# Patient Record
Sex: FEMALE | Race: OTHER | HISPANIC OR LATINO | Employment: FULL TIME | URBAN - METROPOLITAN AREA
[De-identification: names, ages, dates, MRNs, and addresses within clinical notes are randomized per-mention and may not be internally consistent; named-entity substitution may affect disease eponyms.]

---

## 2020-01-21 PROBLEM — I10 ESSENTIAL HYPERTENSION: Status: ACTIVE | Noted: 2020-01-21

## 2020-01-21 PROBLEM — G45.9 TIA (TRANSIENT ISCHEMIC ATTACK): Status: ACTIVE | Noted: 2020-01-21

## 2021-02-18 ENCOUNTER — TELEPHONE (OUTPATIENT)
Dept: CARDIOLOGY | Facility: MEDICAL CENTER | Age: 61
End: 2021-02-18

## 2021-02-18 NOTE — TELEPHONE ENCOUNTER
Called new patient in regards to her appointment with  on 02/24/21 @ 9:30am to ask about her previous cardiologist, recent blood work/cardiac testing, and to let her know she will have an EKG on his appointment date. No answer, left voicemail with a callback number.

## 2021-02-24 ENCOUNTER — TELEPHONE (OUTPATIENT)
Dept: CARDIOLOGY | Facility: PHYSICIAN GROUP | Age: 61
End: 2021-02-24

## 2021-02-24 ENCOUNTER — OFFICE VISIT (OUTPATIENT)
Dept: CARDIOLOGY | Facility: PHYSICIAN GROUP | Age: 61
End: 2021-02-24

## 2021-02-24 ENCOUNTER — NON-PROVIDER VISIT (OUTPATIENT)
Dept: CARDIOLOGY | Facility: PHYSICIAN GROUP | Age: 61
End: 2021-02-24

## 2021-02-24 VITALS
HEIGHT: 61 IN | DIASTOLIC BLOOD PRESSURE: 60 MMHG | WEIGHT: 121 LBS | HEART RATE: 60 BPM | SYSTOLIC BLOOD PRESSURE: 120 MMHG | BODY MASS INDEX: 22.84 KG/M2 | OXYGEN SATURATION: 99 %

## 2021-02-24 DIAGNOSIS — G45.9 TIA (TRANSIENT ISCHEMIC ATTACK): ICD-10-CM

## 2021-02-24 DIAGNOSIS — G47.31 CENTRAL SLEEP APNEA: ICD-10-CM

## 2021-02-24 DIAGNOSIS — I47.10 SVT (SUPRAVENTRICULAR TACHYCARDIA) (HCC): ICD-10-CM

## 2021-02-24 DIAGNOSIS — Q21.12 PFO (PATENT FORAMEN OVALE): ICD-10-CM

## 2021-02-24 DIAGNOSIS — I48.0 PAF (PAROXYSMAL ATRIAL FIBRILLATION) (HCC): ICD-10-CM

## 2021-02-24 DIAGNOSIS — I34.1 MITRAL VALVE PROLAPSE: ICD-10-CM

## 2021-02-24 DIAGNOSIS — I10 ESSENTIAL HYPERTENSION: ICD-10-CM

## 2021-02-24 DIAGNOSIS — E78.5 DYSLIPIDEMIA: ICD-10-CM

## 2021-02-24 DIAGNOSIS — R07.89 OTHER CHEST PAIN: ICD-10-CM

## 2021-02-24 PROCEDURE — 99204 OFFICE O/P NEW MOD 45 MIN: CPT | Performed by: INTERNAL MEDICINE

## 2021-02-24 RX ORDER — CLONAZEPAM 0.5 MG/1
0.5 TABLET ORAL PRN
COMMUNITY

## 2021-02-24 RX ORDER — TELMISARTAN 40 MG/1
TABLET ORAL DAILY
COMMUNITY
End: 2021-02-24

## 2021-02-24 NOTE — LETTER
[facility]   [address] [place] [address]  Phone: [phone]  Fax: [phone]              Lolly Chen  1960    Encounter Date: 2/24/2021    Melinda Garcia M.D.          PROGRESS NOTE:  Subjective:   Chief Complaint:   Chief Complaint   Patient presents with   • Dyslipidemia       Lolly Chen is a 60 y.o. female who is referred by Dr. Jennifer Gilbert for TIA, HTN, side effects to meds, PAF, PFO, palpitations, mitral valve prolapse.    Has HTN since 2015 but did not treat it as she lives a healthy lifestyle.  Can feel when it is elevated.    Nov 2019 had TIA, woke up with sx of very high BP, had hemiparesis on right, went to the hospital, gibberish speech.  Next month, BP went high, similar symptoms but they resolved.  Was then having trouble with sleep and anxiety after that.    Daytime BP is 140.  Wakes up in the middle of the night with BP >200 systolic.  Sometimes drops to 99 during the day. Changed meds around, better.    Started meds for HTN but having some side effects.    Heart monitor in [place] with a 5 beat atrial run of occult atrial fibrillation, also PVCs and PACs.  Notes heart racing with palpitations, will race for a few seconds and then stop.  Very symptomatic.    Lives in [place].  Travels to [place].    Prior meds:  -Metoprolol Succinate 100 mg  -Losartan 50 mg    Currently on telmisartan and nebivolol.    Sees cardiologist in [place] who started lexapro.    No prior hyperlipidemia, LDL 98.  No family history of premature coronary artery disease.  Parents with HTN.  No prior smoking history.  No history of diabetes.  No history of autoimmune disease such as lupus or rheumatoid arthritis.  No chronic kidney disease.  No ETOH overuse.  No caffeine overuse.  No recreation substance use.  No hx asthma.    Exercises, vegetarian.    DATA REVIEWED by me:  ECG (my personal interpretation) 2-24-21  Sinus, 59,  RAD    Holter monitor [place] 9-  PVCs and PACs    Echo [place]  Septum 0.9, posterior wall 1, EF 73%, trileaflet aortic valve, mitral valve prolapse of the anterior wall, mild mitral regurgitation, RVSP 23, aneurysmal septum, positive bubble study with possible PFO    Most recent labs:       Lab Results   Component Value Date/Time    WBC 7.2 12/21/2017 07:45 AM    HEMOGLOBIN 14.7 12/21/2017 07:45 AM    HEMATOCRIT 44.2 12/21/2017 07:45 AM    MCV 95.7 12/21/2017 07:45 AM      Lab Results   Component Value Date/Time    SODIUM 143 12/21/2017 07:45 AM    POTASSIUM 4.3 12/21/2017 07:45 AM    CHLORIDE 106 12/21/2017 07:45 AM    CO2 26 12/21/2017 07:45 AM    GLUCOSE 94 12/21/2017 07:45 AM    BUN 16 12/21/2017 07:45 AM    CREATININE 0.8 12/21/2017 07:45 AM      Lab Results   Component Value Date/Time    ASTSGOT 17 12/21/2017 07:45 AM    ALTSGPT 18 12/21/2017 07:45 AM    ALBUMIN 4.0 12/21/2017 07:45 AM      Lab Results   Component Value Date/Time    CHOLSTRLTOT 164 12/21/2017 07:45 AM    LDL 98 12/21/2017 07:45 AM    HDL 48.0 12/21/2017 07:45 AM    TRIGLYCERIDE 88 12/21/2017 07:45 AM     No results for input(s): NTPROBNP, TROPONINT in the last 72 hours.      Past Medical History:   Diagnosis Date   • History of breast implant    • History of mumps    • History of pneumonia    • Hypertension      Past Surgical History:   Procedure Laterality Date   • MAMMOPLASTY AUGMENTATION Bilateral 2014     Family History   Problem Relation Age of Onset   • Hypertension Mother    • Hypertension Father    • Hypertension Brother    • Cancer Maternal Grandmother         Liver   • Cancer Paternal Uncle    • Cancer Paternal Uncle    • Cancer Maternal Aunt    • Suicide Attempts Paternal Grandfather         Successful     Social History     Socioeconomic History   • Marital status:      Spouse name: Not on file   • Number of children: 3   • Years of education: 16   • Highest education level: Not on file   Occupational History   • Not  on file   Tobacco Use   • Smoking status: Never Smoker   • Smokeless tobacco: Never Used   Substance and Sexual Activity   • Alcohol use: Never   • Drug use: No   • Sexual activity: Not on file   Other Topics Concern   • Not on file   Social History Narrative   • Not on file     Social Determinants of Health     Financial Resource Strain:    • Difficulty of Paying Living Expenses:    Food Insecurity:    • Worried About Running Out of Food in the Last Year:    • Ran Out of Food in the Last Year:    Transportation Needs:    • Lack of Transportation (Medical):    • Lack of Transportation (Non-Medical):    Physical Activity:    • Days of Exercise per Week:    • Minutes of Exercise per Session:    Stress:    • Feeling of Stress :    Social Connections:    • Frequency of Communication with Friends and Family:    • Frequency of Social Gatherings with Friends and Family:    • Attends Sikhism Services:    • Active Member of Clubs or Organizations:    • Attends Club or Organization Meetings:    • Marital Status:    Intimate Partner Violence:    • Fear of Current or Ex-Partner:    • Emotionally Abused:    • Physically Abused:    • Sexually Abused:      No Known Allergies    Current Outpatient Medications   Medication Sig Dispense Refill   • clonazePAM (KLONOPIN) 0.5 MG Tab Take  by mouth.     • Escitalopram Oxalate (LEXAPRO PO) Take 2.5 mg by mouth every day.     • Coenzyme Q10 (COQ10) 100 MG Cap Take 1 Capsule by mouth every day.     • Vitamin D-Vitamin K (VITAMIN K2-VITAMIN D3 PO) Take  by mouth.     • telmisartan (MICARDIS) 40 MG Tab Take 1 Tab by mouth every day. 90 Tab 1   • nebivolol (BYSTOLIC) 10 MG Tab Take 1 Tab by mouth every day. (Patient taking differently: Take 5 mg by mouth every day.) 30 Tab 1   • aspirin 81 MG tablet Take 81 mg by mouth every day.     • Turmeric, Curcuma Longa, (CURCUMIN) Powder by Does not apply route.     • VITAMIN E PO Take  by mouth.     • Ascorbic Acid (VITAMIN C PO) Take  by mouth.    "    No current facility-administered medications for this visit.       ROS  All others systems reviewed and negative.     Objective:     /60 (BP Location: Left arm, Patient Position: Sitting, BP Cuff Size: Adult)   Pulse 60   Ht 1.549 m (5' 1\")   Wt 54.9 kg (121 lb)   SpO2 99%  Body mass index is 22.86 kg/m².    General: No acute distress. Well nourished.  HEENT: EOM grossly intact, no scleral icterus, no pharyngeal erythema.   Neck:  No JVD, no bruits, trachea midline  CVS: RRR. Normal S1, S2. No M/R/G. No LE edema.  2+ radial pulses, 2+ PT pulses  Resp: CTAB. No wheezing or crackles/rhonchi. Normal respiratory effort.  Abdomen: Soft, NT, no mo hepatomegaly.  MSK/Ext: No clubbing or cyanosis.  Skin: Warm and dry, no rashes.  Neurological: CN III-XII grossly intact. No focal deficits.   Psych: A&O x 3, appropriate affect, good judgement        Assessment:     1. TIA (transient ischemic attack)  EKG    REFERRAL TO PULMONARY AND SLEEP MEDICINE    ZIO PATCH MONITOR   2. Essential hypertension  EKG    REFERRAL TO PULMONARY AND SLEEP MEDICINE   3. Dyslipidemia  EKG   4. Other chest pain     5. Central sleep apnea  REFERRAL TO PULMONARY AND SLEEP MEDICINE   6. PAF (paroxysmal atrial fibrillation) ([facility])  ZIO PATCH MONITOR   7. PFO (patent foramen ovale)  EC-ECHOCARDIOGRAM COMPLETE W/O CONT   8. Mitral valve prolapse         Medical Decision Making:  Today's Assessment / Status / Plan:     -Complex situation with multiple causes for stroke  -Does sound like TIA was from uncontrolled HTN but may have been related to PFO or even PAF  -Labile HTN, elevated jennifer at night, needs sleep study, referral made  -If she has atrial fibrillation, she will need to be on anticoagulation for chads 2 vascular score of 4  -Continue aspirin for now  -LDL ok without statin  -Palpitations are bothersome, previously intolerant to metoprolol succinate 100 mg, better on nebivolol  -Needs testing and close follow-up, see below  -Return " to clinic 4 weeks      Written instructions given today:    -Call [facility] to get evaluated for Central Sleep Apnea.    -If you have a patent foramen ovale, which is a hole in your heart, that can be related to stroke.  I need another echocardiogram with a bubble study done at [facility].  We cannot do it in [place] because they need to start an IV.    -If you have a significant PFO, we usually do a transesophageal echocardiogram down the road where we put you to sleep and pass a probe down your esophagus to see the hole in the heart better to see if it needs a closure device because you have had a stroke.  The closure device is typically an Amplatzer closure device.  You can read about it on the Internet.    -Diagnosing atrial fibrillation takes time.  Based on your prior Holter, we cannot say that is truly atrial fibrillation.  It matters because if you had a stroke you need to be on full blood thinner.  We will start with a Zio patch heart monitor for 2 weeks.    -Your heart echocardiogram in [place] also said that you had mitral valve prolapse with mildly leaking valve.  We will be able to see if this is true on her echocardiogram here, it can be associated with rhythm changes of the heart such as atrial fibrillation.      Return in about 4 weeks (around 3/24/2021).    It is my pleasure to participate in the care of Ms. Chen.  Please do not hesitate to contact me with questions or concerns.    Melinda Garcia MD, [facility]  Cardiologist [facility]    Please note that this dictation was created using voice recognition software. I have made every reasonable attempt to correct obvious errors, but it is possible there are errors of grammar and possibly content that I did not discover before finalizing the note.        Jennifer Gilbert D.O.  [address]  [place] [address]  Via In Basket

## 2021-02-24 NOTE — PATIENT INSTRUCTIONS
-Call [facility] to get evaluated for Central Sleep Apnea.    -If you have a patent foramen ovale, which is a hole in your heart, that can be related to stroke.  I need another echocardiogram with a bubble study done at [facility].  We cannot do it in [place] because they need to start an IV.    -If you have a significant PFO, we usually do a transesophageal echocardiogram down the road where we put you to sleep and pass a probe down your esophagus to see the hole in the heart better to see if it needs a closure device because you have had a stroke.  The closure device is typically an Amplatzer closure device.  You can read about it on the Internet.    -Diagnosing atrial fibrillation takes time.  Based on your prior Holter, we cannot say that is truly atrial fibrillation.  It matters because if you had a stroke you need to be on full blood thinner.  We will start with a Zio patch heart monitor for 2 weeks.    -Your heart echocardiogram in [place] also said that you had mitral valve prolapse with mildly leaking valve.  We will be able to see if this is true on her echocardiogram here, it can be associated with rhythm changes of the heart such as atrial fibrillation.

## 2021-02-24 NOTE — PROGRESS NOTES
Subjective:   Chief Complaint:   Chief Complaint   Patient presents with   • Dyslipidemia       Lolly Chen is a 60 y.o. female who is referred by Dr. Jennifer Gilbert for TIA, HTN, side effects to meds, PAF, PFO, palpitations, mitral valve prolapse.    Has HTN since 2015 but did not treat it as she lives a healthy lifestyle.  Can feel when it is elevated.    Nov 2019 had TIA, woke up with sx of very high BP, had hemiparesis on right, went to the hospital, gibberish speech.  Next month, BP went high, similar symptoms but they resolved.  Was then having trouble with sleep and anxiety after that.    Daytime BP is 140.  Wakes up in the middle of the night with BP >200 systolic.  Sometimes drops to 99 during the day. Changed meds around, better.    Started meds for HTN but having some side effects.    Heart monitor in [place] with a 5 beat atrial run of occult atrial fibrillation, also PVCs and PACs.  Notes heart racing with palpitations, will race for a few seconds and then stop.  Very symptomatic.    Lives in [place].  Travels to [place].    Prior meds:  -Metoprolol Succinate 100 mg  -Losartan 50 mg    Currently on telmisartan and nebivolol.    Sees cardiologist in [place] who started lexapro.    No prior hyperlipidemia, LDL 98.  No family history of premature coronary artery disease.  Parents with HTN.  No prior smoking history.  No history of diabetes.  No history of autoimmune disease such as lupus or rheumatoid arthritis.  No chronic kidney disease.  No ETOH overuse.  No caffeine overuse.  No recreation substance use.  No hx asthma.    Exercises, vegetarian.    DATA REVIEWED by me:  ECG (my personal interpretation) 2-24-21  Sinus, 59, RAD    Holter monitor [place] 9-  PVCs and PACs    Echo [place]  Septum 0.9, posterior wall 1, EF 73%, trileaflet aortic valve, mitral valve prolapse of the anterior wall, mild mitral regurgitation, RVSP 23, aneurysmal septum, positive bubble study with possible PFO    Most  recent labs:       Lab Results   Component Value Date/Time    WBC 7.2 12/21/2017 07:45 AM    HEMOGLOBIN 14.7 12/21/2017 07:45 AM    HEMATOCRIT 44.2 12/21/2017 07:45 AM    MCV 95.7 12/21/2017 07:45 AM      Lab Results   Component Value Date/Time    SODIUM 143 12/21/2017 07:45 AM    POTASSIUM 4.3 12/21/2017 07:45 AM    CHLORIDE 106 12/21/2017 07:45 AM    CO2 26 12/21/2017 07:45 AM    GLUCOSE 94 12/21/2017 07:45 AM    BUN 16 12/21/2017 07:45 AM    CREATININE 0.8 12/21/2017 07:45 AM      Lab Results   Component Value Date/Time    ASTSGOT 17 12/21/2017 07:45 AM    ALTSGPT 18 12/21/2017 07:45 AM    ALBUMIN 4.0 12/21/2017 07:45 AM      Lab Results   Component Value Date/Time    CHOLSTRLTOT 164 12/21/2017 07:45 AM    LDL 98 12/21/2017 07:45 AM    HDL 48.0 12/21/2017 07:45 AM    TRIGLYCERIDE 88 12/21/2017 07:45 AM     No results for input(s): NTPROBNP, TROPONINT in the last 72 hours.      Past Medical History:   Diagnosis Date   • History of breast implant    • History of mumps    • History of pneumonia    • Hypertension      Past Surgical History:   Procedure Laterality Date   • MAMMOPLASTY AUGMENTATION Bilateral 2014     Family History   Problem Relation Age of Onset   • Hypertension Mother    • Hypertension Father    • Hypertension Brother    • Cancer Maternal Grandmother         Liver   • Cancer Paternal Uncle    • Cancer Paternal Uncle    • Cancer Maternal Aunt    • Suicide Attempts Paternal Grandfather         Successful     Social History     Socioeconomic History   • Marital status:      Spouse name: Not on file   • Number of children: 3   • Years of education: 16   • Highest education level: Not on file   Occupational History   • Not on file   Tobacco Use   • Smoking status: Never Smoker   • Smokeless tobacco: Never Used   Substance and Sexual Activity   • Alcohol use: Never   • Drug use: No   • Sexual activity: Not on file   Other Topics Concern   • Not on file   Social History Narrative   • Not on file  "    Social Determinants of Health     Financial Resource Strain:    • Difficulty of Paying Living Expenses:    Food Insecurity:    • Worried About Running Out of Food in the Last Year:    • Ran Out of Food in the Last Year:    Transportation Needs:    • Lack of Transportation (Medical):    • Lack of Transportation (Non-Medical):    Physical Activity:    • Days of Exercise per Week:    • Minutes of Exercise per Session:    Stress:    • Feeling of Stress :    Social Connections:    • Frequency of Communication with Friends and Family:    • Frequency of Social Gatherings with Friends and Family:    • Attends [employer] Services:    • Active Member of Clubs or Organizations:    • Attends Club or Organization Meetings:    • Marital Status:    Intimate Partner Violence:    • Fear of Current or Ex-Partner:    • Emotionally Abused:    • Physically Abused:    • Sexually Abused:      No Known Allergies    Current Outpatient Medications   Medication Sig Dispense Refill   • clonazePAM (KLONOPIN) 0.5 MG Tab Take  by mouth.     • Escitalopram Oxalate (LEXAPRO PO) Take 2.5 mg by mouth every day.     • Coenzyme Q10 (COQ10) 100 MG Cap Take 1 Capsule by mouth every day.     • Vitamin D-Vitamin K (VITAMIN K2-VITAMIN D3 PO) Take  by mouth.     • telmisartan (MICARDIS) 40 MG Tab Take 1 Tab by mouth every day. 90 Tab 1   • nebivolol (BYSTOLIC) 10 MG Tab Take 1 Tab by mouth every day. (Patient taking differently: Take 5 mg by mouth every day.) 30 Tab 1   • aspirin 81 MG tablet Take 81 mg by mouth every day.     • Turmeric, Curcuma Longa, (CURCUMIN) Powder by Does not apply route.     • VITAMIN E PO Take  by mouth.     • Ascorbic Acid (VITAMIN C PO) Take  by mouth.       No current facility-administered medications for this visit.       ROS  All others systems reviewed and negative.     Objective:     /60 (BP Location: Left arm, Patient Position: Sitting, BP Cuff Size: Adult)   Pulse 60   Ht 1.549 m (5' 1\")   Wt 54.9 kg (121 lb)   " SpO2 99%  Body mass index is 22.86 kg/m².    General: No acute distress. Well nourished.  HEENT: EOM grossly intact, no scleral icterus, no pharyngeal erythema.   Neck:  No JVD, no bruits, trachea midline  CVS: RRR. Normal S1, S2. No M/R/G. No LE edema.  2+ radial pulses, 2+ PT pulses  Resp: CTAB. No wheezing or crackles/rhonchi. Normal respiratory effort.  Abdomen: Soft, NT, no mo hepatomegaly.  MSK/Ext: No clubbing or cyanosis.  Skin: Warm and dry, no rashes.  Neurological: CN III-XII grossly intact. No focal deficits.   Psych: A&O x 3, appropriate affect, good judgement        Assessment:     1. TIA (transient ischemic attack)  EKG    REFERRAL TO PULMONARY AND SLEEP MEDICINE    ZIO PATCH MONITOR   2. Essential hypertension  EKG    REFERRAL TO PULMONARY AND SLEEP MEDICINE   3. Dyslipidemia  EKG   4. Other chest pain     5. Central sleep apnea  REFERRAL TO PULMONARY AND SLEEP MEDICINE   6. PAF (paroxysmal atrial fibrillation) ([facility])  ZIO PATCH MONITOR   7. PFO (patent foramen ovale)  EC-ECHOCARDIOGRAM COMPLETE W/O CONT   8. Mitral valve prolapse         Medical Decision Making:  Today's Assessment / Status / Plan:     -Complex situation with multiple causes for stroke  -Does sound like TIA was from uncontrolled HTN but may have been related to PFO or even PAF  -Labile HTN, elevated jennifer at night, needs sleep study, referral made  -If she has atrial fibrillation, she will need to be on anticoagulation for chads 2 vascular score of 4  -Continue aspirin for now  -LDL ok without statin  -Palpitations are bothersome, previously intolerant to metoprolol succinate 100 mg, better on nebivolol  -Needs testing and close follow-up, see below  -Return to clinic 4 weeks      Written instructions given today:    -Call [facility] to get evaluated for Central Sleep Apnea.    -If you have a patent foramen ovale, which is a hole in your heart, that can be related to stroke.  I need another echocardiogram with a  bubble study done at [facility].  We cannot do it in [place] because they need to start an IV.    -If you have a significant PFO, we usually do a transesophageal echocardiogram down the road where we put you to sleep and pass a probe down your esophagus to see the hole in the heart better to see if it needs a closure device because you have had a stroke.  The closure device is typically an Amplatzer closure device.  You can read about it on the Internet.    -Diagnosing atrial fibrillation takes time.  Based on your prior Holter, we cannot say that is truly atrial fibrillation.  It matters because if you had a stroke you need to be on full blood thinner.  We will start with a Zio patch heart monitor for 2 weeks.    -Your heart echocardiogram in [place] also said that you had mitral valve prolapse with mildly leaking valve.  We will be able to see if this is true on her echocardiogram here, it can be associated with rhythm changes of the heart such as atrial fibrillation.      Return in about 4 weeks (around 3/24/2021).    It is my pleasure to participate in the care of Ms. Chen.  Please do not hesitate to contact me with questions or concerns.    Melinda Garcia MD, [facility]  Cardiologist [facility]    Please note that this dictation was created using voice recognition software. I have made every reasonable attempt to correct obvious errors, but it is possible there are errors of grammar and possibly content that I did not discover before finalizing the note.

## 2021-03-22 PROCEDURE — 93248 EXT ECG>7D<15D REV&INTERPJ: CPT | Performed by: INTERNAL MEDICINE

## 2021-03-22 PROCEDURE — 93246 EXT ECG>7D<15D RECORDING: CPT | Performed by: INTERNAL MEDICINE

## 2021-04-05 ENCOUNTER — HOSPITAL ENCOUNTER (OUTPATIENT)
Dept: CARDIOLOGY | Facility: MEDICAL CENTER | Age: 61
End: 2021-04-05
Attending: INTERNAL MEDICINE

## 2021-04-05 DIAGNOSIS — Q21.12 PFO (PATENT FORAMEN OVALE): ICD-10-CM

## 2021-04-05 PROBLEM — Z78.9 NON-SMOKER: Status: ACTIVE | Noted: 2021-04-05

## 2021-04-05 PROBLEM — G47.33 OSA (OBSTRUCTIVE SLEEP APNEA): Status: ACTIVE | Noted: 2021-04-05

## 2021-04-05 LAB
LV EJECT FRACT  99904: 65
LV EJECT FRACT MOD 2C 99903: 75
LV EJECT FRACT MOD 4C 99902: 78.59
LV EJECT FRACT MOD BP 99901: 76.53

## 2021-04-05 PROCEDURE — 93306 TTE W/DOPPLER COMPLETE: CPT

## 2021-04-05 PROCEDURE — 93306 TTE W/DOPPLER COMPLETE: CPT | Mod: 26 | Performed by: INTERNAL MEDICINE

## 2021-04-05 NOTE — PROGRESS NOTES
CC: Telemedicine sleep consultation    This sleep consultation is provided using Telemedicine and secure encrypted software. Consent was obtained.    Consulting MD: Charly Steward M.D.  Requesting Physician: Dr. Melinda Garcia MD  Patient name:      Lolly Chen                  Please note that I could not evaluate the patient in person at the site. All examination and evaluation of the patient and information gathering from the patient and/or the family were done jointly by me via licensed and securely encrypted tele-video communication equipment with the assistance of a trained tele-presenter at the originating site.  As such, my assessments and recommendations are limited as far as such telecommunication allows.    HPI:  Lolly Chen is a 60 y.o.female from [place] (works in childcare) kindly referred by Dr. Melinda Garcia MD for evaluation of ELIE.  She usually works from 8 AM to 5 PM.  She goes to bed at 10-11 PM and gets up at 6:45 AM on weekdays and 8 AM on weekends    She judges her problem to be considerably serious as her blood pressure increases considerably when she sleeps.    She does not have a regular bed partner.  She estimates that her sleep latency is about 10 minutes.  She may read just prior to turning out the lights and attempting to go to sleep.  She sometimes has difficulty awakening and getting out of bed after sleeping but denies tiredness or sleepiness during the day.  She denies restless legs but reports teeth grinding and disturbing dreams.    Believes she sleeps really well except for nocturnal surges in BP. Started on Lexapro which seemed to help her BP. Family issues may have been causing stress and elevated BP.    Her total [place] score is normal at 7 out of 24. Denies snoring.    Significant comorbidities and modifying factors include htn, tia, paf, pfo, palpitations, mvp, non-smoker, and normal bmi.          Patient Active Problem List    Diagnosis Date Noted   • ELIE  (obstructive sleep apnea) 04/05/2021   • Non-smoker 04/05/2021   • Other chest pain 02/24/2021   • TIA (transient ischemic attack) 01/21/2020   • Essential hypertension 01/21/2020       Past Medical History:   Diagnosis Date   • History of breast implant    • History of mumps    • History of pneumonia    • Hypertension         Past Surgical History:   Procedure Laterality Date   • MAMMOPLASTY AUGMENTATION Bilateral 2014       Family History   Problem Relation Age of Onset   • Hypertension Mother    • Hypertension Father    • Hypertension Brother    • Cancer Maternal Grandmother         Liver   • Cancer Paternal Uncle    • Cancer Paternal Uncle    • Cancer Maternal Aunt    • Suicide Attempts Paternal Grandfather         Successful       Social History     Socioeconomic History   • Marital status:      Spouse name: Not on file   • Number of children: 3   • Years of education: 16   • Highest education level: Not on file   Occupational History   • Not on file   Tobacco Use   • Smoking status: Never Smoker   • Smokeless tobacco: Never Used   Substance and Sexual Activity   • Alcohol use: Never   • Drug use: No   • Sexual activity: Not on file   Other Topics Concern   • Not on file   Social History Narrative   • Not on file     Social Determinants of Health     Financial Resource Strain:    • Difficulty of Paying Living Expenses:    Food Insecurity:    • Worried About Running Out of Food in the Last Year:    • Ran Out of Food in the Last Year:    Transportation Needs:    • Lack of Transportation (Medical):    • Lack of Transportation (Non-Medical):    Physical Activity:    • Days of Exercise per Week:    • Minutes of Exercise per Session:    Stress:    • Feeling of Stress :    Social Connections:    • Frequency of Communication with Friends and Family:    • Frequency of Social Gatherings with Friends and Family:    • Attends [employer] Services:    • Active Member of Clubs or Organizations:    • Attends Club or  "Organization Meetings:    • Marital Status:    Intimate Partner Violence:    • Fear of Current or Ex-Partner:    • Emotionally Abused:    • Physically Abused:    • Sexually Abused:        Current Outpatient Medications   Medication Sig Dispense Refill   • clonazePAM (KLONOPIN) 0.5 MG Tab Take  by mouth.     • Escitalopram Oxalate (LEXAPRO PO) Take 2.5 mg by mouth every day.     • Coenzyme Q10 (COQ10) 100 MG Cap Take 1 Capsule by mouth every day.     • Vitamin D-Vitamin K (VITAMIN K2-VITAMIN D3 PO) Take  by mouth.     • telmisartan (MICARDIS) 40 MG Tab Take 1 Tab by mouth every day. 90 Tab 1   • nebivolol (BYSTOLIC) 10 MG Tab Take 1 Tab by mouth every day. (Patient taking differently: Take 5 mg by mouth every day.) 30 Tab 1   • aspirin 81 MG tablet Take 81 mg by mouth every day.     • Turmeric, Curcuma Longa, (CURCUMIN) Powder by Does not apply route.     • VITAMIN E PO Take  by mouth.     • Ascorbic Acid (VITAMIN C PO) Take  by mouth.       No current facility-administered medications for this visit.    \"CURRENT RX\"    ALLERGIES: Patient has no known allergies.    ROS    Constitutional: Denies fever, chills, sweats,  weight loss, fatigue.  Eyes: Denies vision loss, pain, drainage, double vision, glasses.  Ears/Nose/Mouth/Throat: Denies earache, difficulty hearing, rhinitis/nasal congestion, injury, recurrent sore throat, persistent hoarseness, decayed teeth/toothaches, ringing or buzzing in the ears.  Cardiovascular: Positive for palpitations  Respiratory: Denies shortness of breath, cough, sputum, wheezing, painful breathing, coughing up blood.   Sleep: per HPI  Gastrointestinal: Denies  difficulty swallowing, nausea, abdominal pain, diarrhea, constipation, heartburn.  Genitourinary: Denies  blood in urine, discharge, frequent urination.   Musculoskeletal: Denies painful joints, sore muscles, back pain.   Integumentary: Denies rashes, lumps, color changes.   Neurological: Denies frequent headaches,weakness, " "dizziness.    PHYSICAL EXAM  Normal BMI    /85 (BP Location: Left arm, Patient Position: Sitting, BP Cuff Size: Adult)   Pulse 65   Temp 35.9 °C (96.6 °F)   Ht 1.575 m (5' 2\")   Wt 55.3 kg (122 lb)   SpO2 97%   BMI 22.31 kg/m²   Appearance: Well-nourished, well-developed, no acute distress  Eyes:  PERRLA, EOMI  ENMT: Mask on  Neck: Supple, trachea midline, no masses  Respiratory effort:  No intercostal retractions or use of accessory muscles  Lung auscultation:  No wheezes rhonchi rubs or rales  Cardiac: No murmurs, rubs, or gallops; regular rhythm, normal rate; no edema  Abdomen:  No tenderness, no organomegaly.  Not obese  Musculoskeletal:  Grossly normal; gait and station normal; digits and nails normal  Skin:  No rashes, petechiae, cyanosis  Neurologic: without focal signs; oriented to person, time, place, and purpose; judgement intact  Psychiatric:  No depression, anxiety, agitation      Medical decision making  The medical record was reviewed in its entirety including the referral notes, records from primary care, consultants notes, hospital records, lab, imaging, microbiology, immunology, and immunizations.  Care gaps identified and reviewed with the patient.    Diagnostic and titration nocturnal polysomnogram's, home sleep apnea tests, continuous nocturnal oximetry results, multiple sleep latency tests, and compliance reports reviewed.    1. ELIE (obstructive sleep apnea)    - Overnight Home Sleep Study; Future    2. Essential hypertension      3. TIA (transient ischemic attack)      4. Non-smoker        PLAN:   The patient has signs and symptoms consistent with obstructive sleep apnea hypopnea syndrome. Will schedule a nocturnal polysomnogram or a home sleep apnea test depending on signs and symptoms as well as insurance restrictions.    The risks of untreated sleep apnea were discussed with the patient at length. Patients with ELIE are at increased risk of cardiovascular disease including " coronary artery disease, systemic arterial hypertension, pulmonary arterial hypertension, cardiac arrythmias, and stroke. ELIE patients have an increased risk of motor vehicle accidents, type 2 diabetes, chronic kidney disease, and non-alcoholic liver disease. The patient was advised to avoid driving a motor vehicle when drowsy.    Positive airway pressure will favorably impact many of the adverse conditions and effects provoked by ELIE.    Have advised the patient to follow up with the appropriate healthcare practitioners for all other medical problems and issues.    Mask wearing, handwashing, and social distancing protocols reviewed and encouraged.  Total time 45 minutes      Return for after sleep study.

## 2021-04-06 ENCOUNTER — TELEMEDICINE2 (OUTPATIENT)
Dept: SLEEP MEDICINE | Facility: MEDICAL CENTER | Age: 61
End: 2021-04-06

## 2021-04-06 VITALS
HEIGHT: 62 IN | BODY MASS INDEX: 22.45 KG/M2 | WEIGHT: 122 LBS | HEART RATE: 65 BPM | SYSTOLIC BLOOD PRESSURE: 149 MMHG | OXYGEN SATURATION: 97 % | DIASTOLIC BLOOD PRESSURE: 85 MMHG | TEMPERATURE: 96.6 F

## 2021-04-06 DIAGNOSIS — Z78.9 NON-SMOKER: ICD-10-CM

## 2021-04-06 DIAGNOSIS — G45.9 TIA (TRANSIENT ISCHEMIC ATTACK): ICD-10-CM

## 2021-04-06 DIAGNOSIS — G47.33 OSA (OBSTRUCTIVE SLEEP APNEA): ICD-10-CM

## 2021-04-06 DIAGNOSIS — I10 ESSENTIAL HYPERTENSION: ICD-10-CM

## 2021-04-06 PROCEDURE — 99204 OFFICE O/P NEW MOD 45 MIN: CPT | Mod: 95,CR | Performed by: INTERNAL MEDICINE

## 2021-04-08 ENCOUNTER — TELEPHONE (OUTPATIENT)
Dept: CARDIOLOGY | Facility: MEDICAL CENTER | Age: 61
End: 2021-04-08

## 2021-04-08 NOTE — TELEPHONE ENCOUNTER
Melinda Garcia M.D.  RENÉ Sosa,   Echo is normal.  Would be reasonable to proceed with loop recorder.  Please touch base with her and find out if she is interested in the loop recorder.  Thank you.   -----------------------------------------------------------------------------------------------------    Laverne dorsey.

## 2021-04-23 ENCOUNTER — HOME STUDY (OUTPATIENT)
Dept: SLEEP MEDICINE | Facility: MEDICAL CENTER | Age: 61
End: 2021-04-23
Attending: INTERNAL MEDICINE

## 2021-04-23 DIAGNOSIS — G47.33 OSA (OBSTRUCTIVE SLEEP APNEA): ICD-10-CM

## 2021-04-23 PROCEDURE — 95806 SLEEP STUDY UNATT&RESP EFFT: CPT | Performed by: INTERNAL MEDICINE

## 2021-04-27 NOTE — PROCEDURES
Interpretation:    This home sleep test was performed on 2021 using a [employer] NightOne recording device. The device has 3 sensors (effort belt, cannula and oximeter) and a built-in body position sensor that provide seven channels of data (body position, pressure flow, snore, respiratory effort, SpO2, pleth and pulse rate).  The effort belt utilizes respiratory inductive plethysmography technology.      The total recording time was 518.5 minutes, the time in bed was 518.5 minutes, and the monitoring time was 518 minutes.    The device recorded 1 central apneas, 22 obstructive apneas, 0 mixed apneas, 25 hypopneas, 48 apneas and hypopneas, and *0* RERAs.  The HOLLY (respiratory event index which is a surrogate for the AHI) was 5.6.  The OAI (obstructive apnea index) was 2.5.  The LYNDSAY (the central apnea index) was 0.1.  The supine HOLLY was 5.7.  Most of the respiratory events occurred in the supine position.    The patient experienced 47 desaturations and the oxygen desaturation index was 5.5.  During sleep the average saturation was 93%, the jimmie saturation was 88%, and the highest saturation was 98%.  The patient spent only 0.1 minute of TIB with saturations less than 89%.      The mean heart rate during sleep was 61 bpm, the maximum heart rate during sleep was 79 bpm, and the minimum heart rate during sleep was 55 bpm.    The patient experienced 52 snoring episodes.  The percentage of snoring was 4.2%.        Assessment:    Mild sleep apnea - HOLLY 5.6  No significant nocturnal desaturation - jimmie saturation 88% % - less than 89% for 0.1 minutes of the TIB  Mild snorin total snoring episodes/percentage of snoring 4.2%        Recommendation:    If significant signs, symptoms, and or comorbidities warrant, recommend a positive airway pressure titration. Alternative treatments for OSAH include behavioral modification, use of a dental appliance, and nasopharyngeal reconstructive surgery.  Behavior modification includes weight loss, eliminating alcohol and sedatives, and avoiding the supine position. If alternative treatments are used, a follow-up diagnostic study is warranted to ensure efficacy.    Clinical correlation is needed.  The patient may be a candidate for auto titrating CPAP.

## 2021-05-04 ENCOUNTER — OFFICE VISIT (OUTPATIENT)
Dept: CARDIOLOGY | Facility: MEDICAL CENTER | Age: 61
End: 2021-05-04

## 2021-05-04 VITALS
BODY MASS INDEX: 22.63 KG/M2 | HEART RATE: 62 BPM | HEIGHT: 62 IN | WEIGHT: 123 LBS | SYSTOLIC BLOOD PRESSURE: 118 MMHG | DIASTOLIC BLOOD PRESSURE: 52 MMHG | OXYGEN SATURATION: 98 %

## 2021-05-04 DIAGNOSIS — I10 ESSENTIAL HYPERTENSION: ICD-10-CM

## 2021-05-04 DIAGNOSIS — F41.9 ANXIETY: ICD-10-CM

## 2021-05-04 DIAGNOSIS — G45.9 TIA (TRANSIENT ISCHEMIC ATTACK): ICD-10-CM

## 2021-05-04 PROCEDURE — 99214 OFFICE O/P EST MOD 30 MIN: CPT | Performed by: NURSE PRACTITIONER

## 2021-05-04 RX ORDER — TELMISARTAN 20 MG/1
20 TABLET ORAL DAILY
COMMUNITY
End: 2022-12-07 | Stop reason: SDUPTHER

## 2021-05-04 RX ORDER — NEBIVOLOL HYDROCHLORIDE 5 MG/1
1.25 TABLET ORAL DAILY
COMMUNITY
End: 2022-12-07 | Stop reason: SDUPTHER

## 2021-05-04 ASSESSMENT — ENCOUNTER SYMPTOMS
DIZZINESS: 0
ORTHOPNEA: 0
LOSS OF CONSCIOUSNESS: 0
PND: 0
PALPITATIONS: 0
HEADACHES: 0
MYALGIAS: 0
ABDOMINAL PAIN: 0
CHILLS: 0
INSOMNIA: 0
FEVER: 0
NAUSEA: 0
COUGH: 0
BRUISES/BLEEDS EASILY: 0
SHORTNESS OF BREATH: 0

## 2021-05-04 NOTE — PROGRESS NOTES
Chief Complaint   Patient presents with   • Follow-Up   • Transient Ischemic Attack   • HTN (Controlled)       Subjective:   Lolly Chen is a 60 y.o. female who presents today for follow-up of history of TIA and hypertension.    Lolly is a 60 year old female with history of TIA in 2019 and hypertension, first seen by Dr. Garcia in February 2021. Echocardiogram with bubble study was done, along with ZioPatch.    She is here today for follow-up. She has recently been seen by psychiatry, and started on Lexapro, along with PRN Klonopin, which has helped immensely. Her BP has been under much better control, and she has cut her BP medications down, more than in 1/2.    She denies any chest pain, pressure or discomfort; occasional skipped beats, but no sustained palpitations. No shortness of breath, orthopnea or PND; no dizziness or syncope; no LE edema. She further TIA symptoms.     Past Medical History:   Diagnosis Date   • History of breast implant    • History of mumps    • History of pneumonia    • History of TIA (transient ischemic attack) 2019    Thought to be due to high BP   • Hypertension      Past Surgical History:   Procedure Laterality Date   • MAMMOPLASTY AUGMENTATION Bilateral 2014     Family History   Problem Relation Age of Onset   • Hypertension Mother    • Hypertension Father    • Hypertension Brother    • Cancer Maternal Grandmother         Liver   • Cancer Paternal Uncle    • Cancer Paternal Uncle    • Cancer Maternal Aunt    • Suicide Attempts Paternal Grandfather         Successful     Social History     Socioeconomic History   • Marital status:      Spouse name: Not on file   • Number of children: 3   • Years of education: 16   • Highest education level: Not on file   Occupational History   • Not on file   Tobacco Use   • Smoking status: Never Smoker   • Smokeless tobacco: Never Used   Substance and Sexual Activity   • Alcohol use: Never   • Drug use: No   • Sexual activity: Not on  file   Other Topics Concern   • Not on file   Social History Narrative   • Not on file     Social Determinants of Health     Financial Resource Strain:    • Difficulty of Paying Living Expenses:    Food Insecurity:    • Worried About Running Out of Food in the Last Year:    • Ran Out of Food in the Last Year:    Transportation Needs:    • Lack of Transportation (Medical):    • Lack of Transportation (Non-Medical):    Physical Activity:    • Days of Exercise per Week:    • Minutes of Exercise per Session:    Stress:    • Feeling of Stress :    Social Connections:    • Frequency of Communication with Friends and Family:    • Frequency of Social Gatherings with Friends and Family:    • Attends [employer] Services:    • Active Member of Clubs or Organizations:    • Attends Club or Organization Meetings:    • Marital Status:    Intimate Partner Violence:    • Fear of Current or Ex-Partner:    • Emotionally Abused:    • Physically Abused:    • Sexually Abused:      No Known Allergies  Outpatient Encounter Medications as of 5/4/2021   Medication Sig Dispense Refill   • telmisartan (MICARDIS) 20 MG tablet Take 15 mg by mouth every day.     • nebivolol (BYSTOLIC) 5 MG Tab tablet Take 2.5 mg by mouth every day.     • clonazePAM (KLONOPIN) 0.5 MG Tab Take 0.5 mg by mouth as needed.     • Escitalopram Oxalate (LEXAPRO PO) Take 2.5 mg by mouth every day.     • Coenzyme Q10 (COQ10) 100 MG Cap Take 1 Capsule by mouth every day.     • Vitamin D-Vitamin K (VITAMIN K2-VITAMIN D3 PO) Take  by mouth every day.     • aspirin 81 MG tablet Take 81 mg by mouth as needed.     • Turmeric, Curcuma Longa, (CURCUMIN) Powder as needed.     • VITAMIN E PO Take  by mouth as needed.     • Ascorbic Acid (VITAMIN C PO) Take  by mouth as needed.     • [DISCONTINUED] telmisartan (MICARDIS) 40 MG Tab Take 1 Tab by mouth every day. 90 Tab 1   • [DISCONTINUED] nebivolol (BYSTOLIC) 10 MG Tab Take 1 Tab by mouth every day. 30 Tab 1     No  "facility-administered encounter medications on file as of 5/4/2021.     Review of Systems   Constitutional: Negative for chills and fever.   HENT: Negative for congestion.    Respiratory: Negative for cough and shortness of breath.    Cardiovascular: Negative for chest pain, palpitations, orthopnea, leg swelling and PND.   Gastrointestinal: Negative for abdominal pain and nausea.   Musculoskeletal: Negative for myalgias.   Skin: Negative for rash.   Neurological: Negative for dizziness, loss of consciousness and headaches.   Endo/Heme/Allergies: Does not bruise/bleed easily.   Psychiatric/Behavioral: The patient does not have insomnia.         Objective:   /52 (BP Location: Left arm, Patient Position: Sitting, BP Cuff Size: Adult)   Pulse 62   Ht 1.575 m (5' 2\")   Wt 55.8 kg (123 lb)   SpO2 98%   BMI 22.50 kg/m²     Physical Exam   Constitutional: She is oriented to person, place, and time. She appears well-developed and well-nourished.   HENT:   Head: Normocephalic.   Eyes: EOM are normal.   Neck: No JVD present.   Cardiovascular: Normal rate, regular rhythm and normal heart sounds.   Pulmonary/Chest: Effort normal and breath sounds normal. No respiratory distress. She has no wheezes. She has no rales.   Abdominal: Soft. Bowel sounds are normal. She exhibits no distension. There is no abdominal tenderness.   Musculoskeletal:         General: No edema. Normal range of motion.      Cervical back: Normal range of motion and neck supple.   Neurological: She is alert and oriented to person, place, and time.   Skin: Skin is warm and dry. No rash noted.   Psychiatric: She has a normal mood and affect.     CONCLUSIONS OF ECHOCARDIOGRAM OF 4/5/2021:  Normal echo.  Left ventricular ejection fraction is visually estimated to be 65%.  Estimated right ventricular systolic pressure  is 32 mmHg.  No evidence of right to left shunt by agitated saline challenge   including valsalva.  No prior study is available for " comparison.     Brenda Summary OF 3/22/2021:    1. Sinus rhythm with appropriate heart rate range. Average 64, range 50 to 184 bpm.   2. Normal sinus node and AV node conduction normal. No pauses.   3. Rare PACs.   4. Rare PVCs.   5. 8 brief runs of SVT, up to 9 beats only. No sustained rhythm changes. No atrial fibrillation/flutter.   6. 2 patient triggers during sinus, rate 63-68 bpm, symptoms reported include pounding, dizzy, anxious, chest pressure.     Assessment:     1. TIA (transient ischemic attack)     2. Essential hypertension     3. Anxiety         Medical Decision Making:  Today's Assessment / Status / Plan:     1. History of TIA in 2019, with no further symptoms. Her BP has been under much better control. Did discuss possible Loop Recorder, and she would like to hold off for now. To monitor BP at home, and let us know if she has further symptoms.    2. Hypertension, treated with low dose Micardis and Bystolic, stable. BP is excellent today.    3. Anxiety, now treated with Lexapro and PRN Klonopin, much improved. She feels so much better.    Same medications for now. Will have her FU with Dr. Garcia in 6 months to reassess BP and any other symptoms. FU sooner if clinical condition changes.

## 2021-05-28 ENCOUNTER — TELEPHONE (OUTPATIENT)
Dept: CARDIOLOGY | Facility: MEDICAL CENTER | Age: 61
End: 2021-05-28

## 2021-11-09 ENCOUNTER — TELEPHONE (OUTPATIENT)
Dept: CARDIOLOGY | Facility: CLINIC | Age: 61
End: 2021-11-09

## 2021-11-09 NOTE — TELEPHONE ENCOUNTER
CHART PREP SAVED, PT NO SHOW ON 11-9-21.    Lolly Chen is a 61 y.o. female who returns for TIA, HTN, side effects to meds, PAF, PFO, palpitations, mitral valve prolapse.    Has HTN since 2015 but did not treat it as she lives a healthy lifestyle.  Can feel when it is elevated.    Nov 2019 had TIA, woke up with sx of very high BP, had hemiparesis on right, went to the hospital, gibberish speech.  Next month, BP went high, similar symptoms but they resolved.  Was then having trouble with sleep and anxiety after that.    Daytime BP is 140.  Wakes up in the middle of the night with BP >200 systolic.  Sometimes drops to 99 during the day. Changed meds around, better.    Started meds for HTN but having some side effects.    Heart monitor in [place] with a 5 beat atrial run of occult atrial fibrillation, also PVCs and PACs.  Notes heart racing with palpitations, will race for a few seconds and then stop.  Very symptomatic.    Lives in [place].  Travels to [place].    Prior meds:  -Metoprolol Succinate 100 mg  -Losartan 50 mg    Currently on telmisartan and nebivolol.    Sees cardiologist in [place] who started lexapro.    No prior hyperlipidemia, LDL 98.  No family history of premature coronary artery disease.  Parents with HTN.  No prior smoking history.  No history of diabetes.  No history of autoimmune disease such as lupus or rheumatoid arthritis.  No chronic kidney disease.  No ETOH overuse.  No caffeine overuse.  No recreation substance use.  No hx asthma.    Exercises, vegetarian.    DATA REVIEWED by me:  ECG (my personal interpretation) 2-24-21  Sinus, 59, RAD    Holter monitor [place] 9-  PVCs and PACs    Echo [place]  Septum 0.9, posterior wall 1, EF 73%, trileaflet aortic valve, mitral valve prolapse of the anterior wall, mild mitral regurgitation, RVSP 23, aneurysmal septum, positive bubble study with possible PFO

## 2025-01-13 PROBLEM — I49.3 PVC'S (PREMATURE VENTRICULAR CONTRACTIONS): Status: ACTIVE | Noted: 2025-01-13

## 2025-01-27 ENCOUNTER — APPOINTMENT (OUTPATIENT)
Dept: CARDIOLOGY | Facility: MEDICAL CENTER | Age: 65
End: 2025-01-27
Attending: INTERNAL MEDICINE

## 2025-02-11 ENCOUNTER — TELEPHONE (OUTPATIENT)
Dept: CARDIOLOGY | Facility: MEDICAL CENTER | Age: 65
End: 2025-02-11

## 2025-02-11 NOTE — TELEPHONE ENCOUNTER
ADD    Caller: Jhoan Boucher NP, Dr. Office/ Speciality: [facility]    Calling about: Requesting to speak to Dr. Valencia regarding an Echo Dr. Valencia read and a possible discrepancy he thinks he saw on the Echo Dr. Valencia read. Requesting a call back to speak to Dr. Valencia very briefly about it, he said it will be a quick call.    Callback Number: [phone]    Thank you,  Norma DIXON

## 2025-02-11 NOTE — TELEPHONE ENCOUNTER
TT- Please see call below. This provider is asking for a call back if possible to discuss an echo you read. That echo has been uploaded in media. Thank you.

## 2025-02-18 NOTE — TELEPHONE ENCOUNTER
ADD    Caller: Lolly Chen    Topic/issue: The patient is requesting a call to discuss results of an Echo TT read. She is worried because he found something that another provider did not (see earlier note on this encounter). Please advise.     Callback Number: [phone]      Thank you,  Charly FLOWERS

## 2025-02-18 NOTE — TELEPHONE ENCOUNTER
Phone Number Called: [phone]    Call outcome: Spoke to patient regarding message below.    Message: Called to discuss    She is a pt of Dr. Mondragon at [facility]. Advised pt that if he had questions about the interpretation of the ECHO noted in media from [facility], read by TT, that he could contact him directly through [facility].    We had a very bad connection and were disconnected 4 times.

## 2025-02-18 NOTE — TELEPHONE ENCOUNTER
Schedulers: Please contact pt to schedule with TT if she would like to discuss her Echo.    Thank you!     Cheo Bingham M.D.  You57 minutes ago (2:28 PM)     Please have her make an appointment with Max Perry or physician extender. Thank you.  DYLAN

## 2025-02-19 NOTE — TELEPHONE ENCOUNTER
Caller: Lolly Chen     Topic/issue: Patient was advised by her cardio provider that she does not need to be seen and is requesting that they give a call to her GP. Martell Branch - [phone]    Please advise.    Callback Number: [phone]     Thank you,  Laura CHARLTON

## 2025-02-27 ENCOUNTER — TELEPHONE (OUTPATIENT)
Dept: CARDIOLOGY | Facility: MEDICAL CENTER | Age: 65
End: 2025-02-27

## 2025-02-27 NOTE — TELEPHONE ENCOUNTER
Spoke to patient in regards to records for NP appointment with [facility].     Patient has seen a cardiologist before?  Yes   If yes, where?: Dr. Mondragon @ [facility]    Any recent cardiac testing outside of [facility]?  No    Were any records requested?  Yes   Fax:[phone]

## 2025-02-28 ENCOUNTER — APPOINTMENT (OUTPATIENT)
Dept: CARDIOLOGY | Facility: MEDICAL CENTER | Age: 65
End: 2025-02-28